# Patient Record
Sex: FEMALE | Race: WHITE | NOT HISPANIC OR LATINO | Employment: OTHER | URBAN - METROPOLITAN AREA
[De-identification: names, ages, dates, MRNs, and addresses within clinical notes are randomized per-mention and may not be internally consistent; named-entity substitution may affect disease eponyms.]

---

## 2021-02-26 ENCOUNTER — IMMUNIZATION (OUTPATIENT)
Dept: PEDIATRICS | Facility: CLINIC | Age: 71
End: 2021-02-26
Payer: COMMERCIAL

## 2021-02-26 PROCEDURE — 91300 PR COVID VAC PFIZER DIL RECON 30 MCG/0.3 ML IM: CPT

## 2021-02-26 PROCEDURE — 0001A PR COVID VAC PFIZER DIL RECON 30 MCG/0.3 ML IM: CPT

## 2021-03-19 ENCOUNTER — IMMUNIZATION (OUTPATIENT)
Dept: PEDIATRICS | Facility: CLINIC | Age: 71
End: 2021-03-19
Attending: INTERNAL MEDICINE
Payer: COMMERCIAL

## 2021-03-19 PROCEDURE — 91300 PR COVID VAC PFIZER DIL RECON 30 MCG/0.3 ML IM: CPT

## 2021-03-19 PROCEDURE — 0002A PR COVID VAC PFIZER DIL RECON 30 MCG/0.3 ML IM: CPT

## 2023-07-19 ENCOUNTER — HOSPITAL ENCOUNTER (EMERGENCY)
Facility: CLINIC | Age: 73
Discharge: HOME | End: 2023-07-19
Attending: PHYSICIAN ASSISTANT | Admitting: PHYSICIAN ASSISTANT
Payer: COMMERCIAL

## 2023-07-19 VITALS
DIASTOLIC BLOOD PRESSURE: 68 MMHG | HEART RATE: 60 BPM | OXYGEN SATURATION: 98 % | SYSTOLIC BLOOD PRESSURE: 114 MMHG | TEMPERATURE: 97.6 F | RESPIRATION RATE: 14 BRPM

## 2023-07-19 DIAGNOSIS — S51.811A LACERATION OF RIGHT FOREARM, INITIAL ENCOUNTER: ICD-10-CM
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
## 2023-07-19 PROCEDURE — 250N000011 HC RX IP 250 OP 636: Performed by: PHYSICIAN ASSISTANT

## 2023-07-19 PROCEDURE — 12002 RPR S/N/AX/GEN/TRNK2.6-7.5CM: CPT

## 2023-07-19 PROCEDURE — 90471 IMMUNIZATION ADMIN: CPT | Performed by: PHYSICIAN ASSISTANT

## 2023-07-19 PROCEDURE — 99284 EMERGENCY DEPT VISIT MOD MDM: CPT | Mod: 25

## 2023-07-19 PROCEDURE — 90714 TD VACC NO PRESV 7 YRS+ IM: CPT | Performed by: PHYSICIAN ASSISTANT

## 2023-07-19 RX ORDER — SULFAMETHOXAZOLE/TRIMETHOPRIM 800-160 MG
1 TABLET ORAL 2 TIMES DAILY
Qty: 14 TABLET | Refills: 0 | Status: ON HOLD | OUTPATIENT
Start: 2023-07-19 | End: 2023-07-22

## 2023-07-19 RX ORDER — CLINDAMYCIN HCL 300 MG
300 CAPSULE ORAL 3 TIMES DAILY
Qty: 21 CAPSULE | Refills: 0 | Status: SHIPPED | OUTPATIENT
Start: 2023-07-19 | End: 2023-07-19

## 2023-07-19 RX ORDER — CLINDAMYCIN HCL 300 MG
300 CAPSULE ORAL 3 TIMES DAILY
Qty: 21 CAPSULE | Refills: 0 | Status: ON HOLD | OUTPATIENT
Start: 2023-07-19 | End: 2023-07-22

## 2023-07-19 RX ORDER — SULFAMETHOXAZOLE/TRIMETHOPRIM 800-160 MG
1 TABLET ORAL 2 TIMES DAILY
Qty: 14 TABLET | Refills: 0 | Status: SHIPPED | OUTPATIENT
Start: 2023-07-19 | End: 2023-07-19

## 2023-07-19 RX ADMIN — CLOSTRIDIUM TETANI TOXOID ANTIGEN (FORMALDEHYDE INACTIVATED) AND CORYNEBACTERIUM DIPHTHERIAE TOXOID ANTIGEN (FORMALDEHYDE INACTIVATED) 0.5 ML: 5; 2 INJECTION, SUSPENSION INTRAMUSCULAR at 09:56

## 2023-07-19 NOTE — ED PROVIDER NOTES
History     Chief Complaint:  Dog Bite     HPI   Marla Spencer is a 72 year old female with a history of hyperlipidemia and essential tremor who presents for evaluation of a dog bite. The patient states that this morning, her two dogs were fighting over food and one of them bit her as she was trying to pull them off of each other. The bite wounds are to the anterior and posterior aspects of her right forearm. Notes that the area around the wounds is tender, but she is not concerned about any broken bones or retained teeth. Dogs are current with immunizations including rabies. Adds that she is allergic to penicillin. Tetanus from 2017.    Independent Historian:   None - Patient Only    Review of External Notes:   None     Medications:    Scopolamine  Duloxetine  Gabapentin   Simvastatin  Levothyroxine  Malarone   Trazodone  Primidone   Propranolol   Omeprazole     Past Medical History:    Essential tremor  Adenoma of colon  Hyperparathyroidism  LEONEL  Elevated parathyroid hormone  Dysthymia  Insomnia  Depression  Hyperlipidemia  Osteopenia  GERD  Fibromyalgia  IBS  Hypothyroidism   Migraine     Past Surgical History:    Appendectomy  Tonsillectomy   section x 2  Laparoscopic cholecystectomy  Parathyroidectomy   Breast biopsy     Physical Exam     Patient Vitals for the past 24 hrs:   BP Temp Pulse Resp SpO2   23 0800 114/68 97.6  F (36.4  C) 60 14 98 %      Physical Exam  Constitutional: Alert, attentive, GCS 15  HENT:    Nose: Nose normal.    Mouth/Throat: Oropharynx is clear, mucous membranes are moist   Eyes: EOM are normal.   CV: regular rate and rhythm; no murmurs, rubs or gallups  Chest: Effort normal and breath sounds normal.   GI:  There is no tenderness. No distension. Normal bowel sounds  MSK: Normal range of motion of right upper extremity. Radial pulse intact. Distal sensation maintained in right upper extremities.  Neurological: Alert, attentive  Skin: Skin is warm and dry. Bite marks to  right forearm. No active bleeding.     Emergency Department Course   Procedures     Laceration Repair #1     Procedure: Laceration Repair    Indication: Laceration    Consent: Verbal    Location: Right Forearm     Length: 1 x 1.5 cm    Preparation: Irrigation with Sterile Saline and Wound Cleanser.     Anesthesia/Sedation: Topical -LET      Treatment/Exploration: Wound explored, no foreign bodies found     Closure: The wound was closed with one layer. Skin/superficial layer was closed with 2 x 5-0 Nylon using Interrupted sutures.     Patient Status: The patient tolerated the procedure well: Yes. There were no complications.      Laceration Repair #2     Procedure: Laceration Repair    Indication: Laceration    Consent: Verbal    Location: Right Forearm     Length: 2.0 cm    Preparation: Irrigation with Sterile Saline and Wound Cleanser.     Anesthesia/Sedation: Topical -LET      Treatment/Exploration: Wound explored, no foreign bodies found     Closure: The wound was closed with one layer. Skin/superficial layer was closed with 3 x 5-0 Nylon using Interrupted sutures.     Patient Status: The patient tolerated the procedure well: Yes. There were no complications.      Laceration Repair #3     Procedure: Laceration Repair    Indication: Laceration    Consent: Verbal    Location: Right Forearm     Length: 0.5 cm    Preparation: Irrigation with Sterile Saline and Wound Cleanser.     Anesthesia/Sedation: Topical -LET      Treatment/Exploration: Wound explored, no foreign bodies found     Closure: The wound was closed with one layer. Skin/superficial layer was closed with 1 x 5-0 Nylon using Interrupted sutures.     Patient Status: The patient tolerated the procedure well: Yes. There were no complications.    Emergency Department Course & Assessments:     Interventions:  Medications   Td (tetanus & diphtheria toxoids) -  adult formulation - for ages 7 years and older (0.5 mLs Intramuscular $Given 7/19/23 0956)       Assessments:  0925 I obtained history and examined the patient as noted above.   1009 I rechecked and updated the patient. I performed laceration repair procedures as noted above.     Independent Interpretation (X-rays, CTs, rhythm strip):  None    Consultations/Discussion of Management or Tests:  None        Social Determinants of Health affecting care:   None    Disposition:  The patient was discharged to home.     Impression & Plan    Medical Decision Making:  Patient is a well-appearing 69-year-old female who presents with dog bites to right forearm after breaking up a fight between her dogs at home.  Dogs are current with vaccinations including rabies.  Vitals appropriate.  Physical examination reveals 3 deep lacerations to the right forearm.  Right upper extremity remains neurovascularly intact.  Low suspicion for fracture or retained foreign bodies.  X-rays are not indicated.  Lacerations were anesthetized and evaluated.  Irrigation was performed as above.  No evidence of retained foreign bodies.  Lacerations were quite deep so they were closed to prevent infection.  Patient tolerated procedure well.  Patient will be sent home with antibiotics as below.  She is otherwise well-appearing and may be managed as an outpatient.  Follow-up with primary care for suture removal as indicated in discharge.  Supportive cares and return precautions to ED were discussed and patient ready for discharge.  Tetanus was updated.    Diagnosis:    ICD-10-CM    1. Dog bite, initial encounter  W54.0XXA       2. Laceration of right forearm, initial encounter  S51.811A            Discharge Medications:  Discharge Medication List as of 7/19/2023 10:46 AM      START taking these medications    Details   clindamycin (CLEOCIN) 300 MG capsule Take 1 capsule (300 mg) by mouth 3 times daily for 7 days, Disp-21 capsule, R-0, Local Print      sulfamethoxazole-trimethoprim (BACTRIM DS) 800-160 MG tablet Take 1 tablet by mouth 2 times daily  for 7 days, Disp-14 tablet, R-0, Local Print            Scribe Disclosure:  I, Akua Mahoney, am serving as a scribe at 9:46 AM on 7/19/2023 to document services personally performed by Almita Saucedo PA-C based on my observations and the provider's statements to me.     7/19/2023   Almita Saucedo PA-C Steinbrueck, Emily, PA-C  07/19/23 1701

## 2023-07-19 NOTE — DISCHARGE INSTRUCTIONS
Keep wounds clean and dry.  You may remove the bandage in 24 hours and gently clean twice daily with soap and water.  Watch for signs of infections as we discussed.  He will be sent home with an antibiotic.  Please take as prescribed.  Follow-up with primary care for suture removal in 10 days.  For any new or worsening symptoms such as signs of infection, return to ED.

## 2023-07-19 NOTE — ED TRIAGE NOTES
Was bitten by own dog this morning on the R arm. Dog UTD on rabies vaccine. Tetanus not UTD Bleeding controlled in triage. New bandage applied. ABCs intact

## 2023-07-20 ENCOUNTER — HOSPITAL ENCOUNTER (OUTPATIENT)
Facility: CLINIC | Age: 73
Setting detail: OBSERVATION
Discharge: HOME | End: 2023-07-22
Attending: EMERGENCY MEDICINE | Admitting: STUDENT IN AN ORGANIZED HEALTH CARE EDUCATION/TRAINING PROGRAM
Payer: COMMERCIAL

## 2023-07-20 DIAGNOSIS — L03.113 CELLULITIS OF RIGHT UPPER EXTREMITY: ICD-10-CM

## 2023-07-20 DIAGNOSIS — S41.152A DOG BITE OF ARM, LEFT, INITIAL ENCOUNTER: Primary | ICD-10-CM

## 2023-07-20 DIAGNOSIS — K59.00 CONSTIPATION, UNSPECIFIED CONSTIPATION TYPE: ICD-10-CM
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
## 2023-07-20 LAB
ANION GAP SERPL CALCULATED.3IONS-SCNC: 13 MMOL/L (ref 7–15)
BASOPHILS # BLD AUTO: 0 10E3/UL (ref 0–0.2)
BASOPHILS NFR BLD AUTO: 0 %
BUN SERPL-MCNC: 12.2 MG/DL (ref 8–23)
CALCIUM SERPL-MCNC: 9.2 MG/DL (ref 8.8–10.2)
CHLORIDE SERPL-SCNC: 100 MMOL/L (ref 98–107)
CREAT SERPL-MCNC: 1.03 MG/DL (ref 0.51–0.95)
DEPRECATED HCO3 PLAS-SCNC: 25 MMOL/L (ref 22–29)
EOSINOPHIL # BLD AUTO: 0.1 10E3/UL (ref 0–0.7)
EOSINOPHIL NFR BLD AUTO: 1 %
ERYTHROCYTE [DISTWIDTH] IN BLOOD BY AUTOMATED COUNT: 12.2 % (ref 10–15)
GFR SERPL CREATININE-BSD FRML MDRD: 57 ML/MIN/1.73M2
GLUCOSE SERPL-MCNC: 89 MG/DL (ref 70–99)
HCT VFR BLD AUTO: 37.4 % (ref 35–47)
HGB BLD-MCNC: 12.4 G/DL (ref 11.7–15.7)
HOLD SPECIMEN: NORMAL
IMM GRANULOCYTES # BLD: 0 10E3/UL
IMM GRANULOCYTES NFR BLD: 0 %
LACTATE SERPL-SCNC: 0.7 MMOL/L (ref 0.7–2)
LYMPHOCYTES # BLD AUTO: 1.6 10E3/UL (ref 0.8–5.3)
LYMPHOCYTES NFR BLD AUTO: 18 %
MCH RBC QN AUTO: 31 PG (ref 26.5–33)
MCHC RBC AUTO-ENTMCNC: 33.2 G/DL (ref 31.5–36.5)
MCV RBC AUTO: 94 FL (ref 78–100)
MONOCYTES # BLD AUTO: 0.8 10E3/UL (ref 0–1.3)
MONOCYTES NFR BLD AUTO: 9 %
NEUTROPHILS # BLD AUTO: 6.2 10E3/UL (ref 1.6–8.3)
NEUTROPHILS NFR BLD AUTO: 72 %
NRBC # BLD AUTO: 0 10E3/UL
NRBC BLD AUTO-RTO: 0 /100
PLATELET # BLD AUTO: 239 10E3/UL (ref 150–450)
POTASSIUM SERPL-SCNC: 4.4 MMOL/L (ref 3.4–5.3)
RBC # BLD AUTO: 4 10E6/UL (ref 3.8–5.2)
SODIUM SERPL-SCNC: 138 MMOL/L (ref 136–145)
WBC # BLD AUTO: 8.6 10E3/UL (ref 4–11)

## 2023-07-20 PROCEDURE — G0378 HOSPITAL OBSERVATION PER HR: HCPCS

## 2023-07-20 PROCEDURE — 250N000011 HC RX IP 250 OP 636: Mod: JZ | Performed by: EMERGENCY MEDICINE

## 2023-07-20 PROCEDURE — 99285 EMERGENCY DEPT VISIT HI MDM: CPT | Mod: 25

## 2023-07-20 PROCEDURE — 80048 BASIC METABOLIC PNL TOTAL CA: CPT | Performed by: EMERGENCY MEDICINE

## 2023-07-20 PROCEDURE — 99223 1ST HOSP IP/OBS HIGH 75: CPT | Mod: AI | Performed by: PHYSICIAN ASSISTANT

## 2023-07-20 PROCEDURE — 250N000013 HC RX MED GY IP 250 OP 250 PS 637: Performed by: PHYSICIAN ASSISTANT

## 2023-07-20 PROCEDURE — 83605 ASSAY OF LACTIC ACID: CPT | Performed by: EMERGENCY MEDICINE

## 2023-07-20 PROCEDURE — 99207 PR NO BILLABLE SERVICE THIS VISIT: CPT | Performed by: STUDENT IN AN ORGANIZED HEALTH CARE EDUCATION/TRAINING PROGRAM

## 2023-07-20 PROCEDURE — 96375 TX/PRO/DX INJ NEW DRUG ADDON: CPT

## 2023-07-20 PROCEDURE — 36415 COLL VENOUS BLD VENIPUNCTURE: CPT | Performed by: EMERGENCY MEDICINE

## 2023-07-20 PROCEDURE — 250N000013 HC RX MED GY IP 250 OP 250 PS 637: Performed by: EMERGENCY MEDICINE

## 2023-07-20 PROCEDURE — 96365 THER/PROPH/DIAG IV INF INIT: CPT

## 2023-07-20 PROCEDURE — 85004 AUTOMATED DIFF WBC COUNT: CPT | Performed by: EMERGENCY MEDICINE

## 2023-07-20 RX ORDER — ACETAMINOPHEN 500 MG
1000 TABLET ORAL ONCE
Status: COMPLETED | OUTPATIENT
Start: 2023-07-20 | End: 2023-07-20

## 2023-07-20 RX ORDER — ACETAMINOPHEN 325 MG/1
975 TABLET ORAL EVERY 8 HOURS
Status: DISCONTINUED | OUTPATIENT
Start: 2023-07-21 | End: 2023-07-22 | Stop reason: HOSPADM

## 2023-07-20 RX ORDER — GABAPENTIN 300 MG/1
600 CAPSULE ORAL AT BEDTIME
COMMUNITY

## 2023-07-20 RX ORDER — SIMVASTATIN 20 MG
20 TABLET ORAL AT BEDTIME
Status: DISCONTINUED | OUTPATIENT
Start: 2023-07-20 | End: 2023-07-22 | Stop reason: HOSPADM

## 2023-07-20 RX ORDER — GABAPENTIN 300 MG/1
600 CAPSULE ORAL AT BEDTIME
Status: DISCONTINUED | OUTPATIENT
Start: 2023-07-20 | End: 2023-07-22 | Stop reason: HOSPADM

## 2023-07-20 RX ORDER — ONDANSETRON 2 MG/ML
4 INJECTION INTRAMUSCULAR; INTRAVENOUS EVERY 6 HOURS PRN
Status: DISCONTINUED | OUTPATIENT
Start: 2023-07-20 | End: 2023-07-22 | Stop reason: HOSPADM

## 2023-07-20 RX ORDER — METRONIDAZOLE 500 MG/100ML
500 INJECTION, SOLUTION INTRAVENOUS EVERY 8 HOURS
Status: DISCONTINUED | OUTPATIENT
Start: 2023-07-21 | End: 2023-07-22 | Stop reason: HOSPADM

## 2023-07-20 RX ORDER — LANOLIN ALCOHOL/MO/W.PET/CERES
2 CREAM (GRAM) TOPICAL 2 TIMES DAILY WITH MEALS
COMMUNITY
End: 2023-07-20

## 2023-07-20 RX ORDER — OXYCODONE HYDROCHLORIDE 5 MG/1
5 TABLET ORAL EVERY 4 HOURS PRN
Status: DISCONTINUED | OUTPATIENT
Start: 2023-07-20 | End: 2023-07-22 | Stop reason: HOSPADM

## 2023-07-20 RX ORDER — LEVOTHYROXINE SODIUM 100 UG/1
100 TABLET ORAL
COMMUNITY

## 2023-07-20 RX ORDER — AMOXICILLIN 250 MG
1 CAPSULE ORAL 2 TIMES DAILY
Status: DISCONTINUED | OUTPATIENT
Start: 2023-07-20 | End: 2023-07-22 | Stop reason: HOSPADM

## 2023-07-20 RX ORDER — DULOXETIN HYDROCHLORIDE 30 MG/1
30 CAPSULE, DELAYED RELEASE ORAL AT BEDTIME
Status: DISCONTINUED | OUTPATIENT
Start: 2023-07-20 | End: 2023-07-22 | Stop reason: HOSPADM

## 2023-07-20 RX ORDER — ACETAMINOPHEN 500 MG
500-1000 TABLET ORAL EVERY 4 HOURS PRN
COMMUNITY

## 2023-07-20 RX ORDER — TRAZODONE HYDROCHLORIDE 100 MG/1
100 TABLET ORAL AT BEDTIME
Status: DISCONTINUED | OUTPATIENT
Start: 2023-07-20 | End: 2023-07-22 | Stop reason: HOSPADM

## 2023-07-20 RX ORDER — VITAMIN B COMPLEX
25 TABLET ORAL DAILY
COMMUNITY

## 2023-07-20 RX ORDER — LIDOCAINE 40 MG/G
CREAM TOPICAL
Status: DISCONTINUED | OUTPATIENT
Start: 2023-07-20 | End: 2023-07-20

## 2023-07-20 RX ORDER — DULOXETIN HYDROCHLORIDE 30 MG/1
30 CAPSULE, DELAYED RELEASE ORAL AT BEDTIME
COMMUNITY

## 2023-07-20 RX ORDER — LEVOTHYROXINE SODIUM 25 UG/1
50 TABLET ORAL WEEKLY
Status: DISCONTINUED | OUTPATIENT
Start: 2023-07-26 | End: 2023-07-22

## 2023-07-20 RX ORDER — TRAZODONE HYDROCHLORIDE 100 MG/1
100 TABLET ORAL AT BEDTIME
COMMUNITY

## 2023-07-20 RX ORDER — CEFTRIAXONE 2 G/1
2 INJECTION, POWDER, FOR SOLUTION INTRAMUSCULAR; INTRAVENOUS ONCE
Status: COMPLETED | OUTPATIENT
Start: 2023-07-20 | End: 2023-07-20

## 2023-07-20 RX ORDER — AMOXICILLIN 250 MG
2 CAPSULE ORAL 2 TIMES DAILY
Status: DISCONTINUED | OUTPATIENT
Start: 2023-07-20 | End: 2023-07-22 | Stop reason: HOSPADM

## 2023-07-20 RX ORDER — LEVOTHYROXINE SODIUM 100 UG/1
50 TABLET ORAL WEEKLY
COMMUNITY

## 2023-07-20 RX ORDER — DULOXETIN HYDROCHLORIDE 20 MG/1
20 CAPSULE, DELAYED RELEASE ORAL DAILY
Status: DISCONTINUED | OUTPATIENT
Start: 2023-07-21 | End: 2023-07-22 | Stop reason: HOSPADM

## 2023-07-20 RX ORDER — METRONIDAZOLE 500 MG/100ML
500 INJECTION, SOLUTION INTRAVENOUS ONCE
Status: COMPLETED | OUTPATIENT
Start: 2023-07-20 | End: 2023-07-20

## 2023-07-20 RX ORDER — PRIMIDONE 50 MG/1
50 TABLET ORAL AT BEDTIME
Status: DISCONTINUED | OUTPATIENT
Start: 2023-07-20 | End: 2023-07-22 | Stop reason: HOSPADM

## 2023-07-20 RX ORDER — CEFTRIAXONE 1 G/1
1 INJECTION, POWDER, FOR SOLUTION INTRAMUSCULAR; INTRAVENOUS EVERY 24 HOURS
Status: DISCONTINUED | OUTPATIENT
Start: 2023-07-21 | End: 2023-07-22

## 2023-07-20 RX ORDER — PRIMIDONE 50 MG/1
50 TABLET ORAL AT BEDTIME
COMMUNITY

## 2023-07-20 RX ORDER — ONDANSETRON 4 MG/1
4 TABLET, ORALLY DISINTEGRATING ORAL EVERY 6 HOURS PRN
Status: DISCONTINUED | OUTPATIENT
Start: 2023-07-20 | End: 2023-07-22 | Stop reason: HOSPADM

## 2023-07-20 RX ORDER — SIMVASTATIN 20 MG
20 TABLET ORAL AT BEDTIME
COMMUNITY

## 2023-07-20 RX ORDER — MULTIPLE VITAMINS W/ MINERALS TAB 9MG-400MCG
1 TAB ORAL DAILY
COMMUNITY

## 2023-07-20 RX ORDER — DULOXETIN HYDROCHLORIDE 20 MG/1
20 CAPSULE, DELAYED RELEASE ORAL EVERY MORNING
COMMUNITY

## 2023-07-20 RX ADMIN — GABAPENTIN 600 MG: 300 CAPSULE ORAL at 21:46

## 2023-07-20 RX ADMIN — OXYCODONE HYDROCHLORIDE 2.5 MG: 5 TABLET ORAL at 21:48

## 2023-07-20 RX ADMIN — TRAZODONE HYDROCHLORIDE 100 MG: 100 TABLET ORAL at 21:47

## 2023-07-20 RX ADMIN — SIMVASTATIN 20 MG: 20 TABLET, FILM COATED ORAL at 22:19

## 2023-07-20 RX ADMIN — CEFTRIAXONE 2 G: 2 INJECTION, POWDER, FOR SOLUTION INTRAMUSCULAR; INTRAVENOUS at 16:08

## 2023-07-20 RX ADMIN — METRONIDAZOLE 500 MG: 500 INJECTION, SOLUTION INTRAVENOUS at 17:05

## 2023-07-20 RX ADMIN — SENNOSIDES AND DOCUSATE SODIUM 1 TABLET: 50; 8.6 TABLET ORAL at 21:49

## 2023-07-20 RX ADMIN — DULOXETINE HYDROCHLORIDE 30 MG: 30 CAPSULE, DELAYED RELEASE ORAL at 21:47

## 2023-07-20 RX ADMIN — ACETAMINOPHEN 1000 MG: 500 TABLET, FILM COATED ORAL at 16:08

## 2023-07-20 ASSESSMENT — ACTIVITIES OF DAILY LIVING (ADL)
ADLS_ACUITY_SCORE: 37
ADLS_ACUITY_SCORE: 35
ADLS_ACUITY_SCORE: 37
ADLS_ACUITY_SCORE: 33
ADLS_ACUITY_SCORE: 37

## 2023-07-20 NOTE — ED NOTES
[facility]  ED Nurse Handoff Report    ED Chief complaint: Dog Bite  . ED Diagnosis:   Final diagnoses:   None       Allergies:   Allergies   Allergen Reactions     Penicillins Anaphylaxis     Adhesive Tape      REDNESS AROUND SITE     Latex      ITCHY THROAT  PN: sneezing ?? gloves         Code Status: Full Code    Activity level - Baseline/Home:  independent.  Activity Level - Current:   standby.   Lift room needed: No.   Bariatric: No   Needed: No   Isolation: No.   Infection: Not Applicable.     Respiratory status: Room air    Vital Signs (within 30 minutes):   Vitals:    07/20/23 1201   BP: (!) 134/95   Pulse: 77   Resp: 17   Temp: 98  F (36.7  C)   TempSrc: Temporal   SpO2: 100%       Cardiac Rhythm:  ,      Pain level:    Patient confused: No.   Patient Falls Risk: patient and family education.   Elimination Status: Has voided     Patient Report - Initial Complaint: Dog bite.   Focused Assessment: Pt complains of dog bite that happened at 0700 yesterday morning. Pt states since then there has been an increase in redness & swelling. Pt was seen here yesterday and told to come back for redness & swelling. Pt states dog is up to date on vaccines.     Abnormal Results:   Labs Ordered and Resulted from Time of ED Arrival to Time of ED Departure   BASIC METABOLIC PANEL - Abnormal       Result Value    Sodium 138      Potassium 4.4      Chloride 100      Carbon Dioxide (CO2) 25      Anion Gap 13      Urea Nitrogen 12.2      Creatinine 1.03 (*)     Calcium 9.2      Glucose 89      GFR Estimate 57 (*)    LACTIC ACID WHOLE BLOOD - Normal    Lactic Acid 0.7     CBC WITH PLATELETS AND DIFFERENTIAL    WBC Count 8.6      RBC Count 4.00      Hemoglobin 12.4      Hematocrit 37.4      MCV 94      MCH 31.0      MCHC 33.2      RDW 12.2      Platelet Count 239      % Neutrophils 72      % Lymphocytes 18      % Monocytes 9      % Eosinophils 1      % Basophils 0      % Immature Granulocytes 0       NRBCs per 100 WBC 0      Absolute Neutrophils 6.2      Absolute Lymphocytes 1.6      Absolute Monocytes 0.8      Absolute Eosinophils 0.1      Absolute Basophils 0.0      Absolute Immature Granulocytes 0.0      Absolute NRBCs 0.0          No orders to display       Treatments provided: See MAR  Family Comments: pt able to update family  OBS brochure/video discussed/provided to patient:  yes  ED Medications:   Medications   metroNIDAZOLE (FLAGYL) infusion 500 mg (has no administration in time range)   acetaminophen (TYLENOL) tablet 1,000 mg (1,000 mg Oral $Given 7/20/23 160)   cefTRIAXone (ROCEPHIN) 2 g vial to attach to  ml bag for ADULTS or NS 50 ml bag for PEDS (2 g Intravenous $New Bag 7/20/23 1608)       Drips infusing:  No  For the majority of the shift this patient was Green.   Interventions performed were NA.    Sepsis treatment initiated: No    Cares/treatment/interventions/medications to be completed following ED care: NA    ED Nurse Name: Kasia Garcia RN  4:57 PM    RECEIVING UNIT ED HANDOFF REVIEW    Above ED Nurse Handoff Report was reviewed: Yes  Reviewed by: Sahara Damico RN on July 20, 2023 at 8:43 PM

## 2023-07-20 NOTE — ED PROVIDER NOTES
History     Chief Complaint:  Dog Bite       HPI   Marla Spencer is a 72 year old female who presents with right arm redness and pain after her dog bit her yesterday morning.  She was seen in the ER with laceration repair and antibiotic initiation.  Despite this, she has had progressive redness of the right forearm.  No fever or other symptoms.  Patient states her dog is up-to-date on its vaccinations including rabies vaccine.      Medications:    clindamycin (CLEOCIN) 300 MG capsule  sulfamethoxazole-trimethoprim (BACTRIM DS) 800-160 MG tablet        Past Medical History:    No past medical history on file.    Past Surgical History:    No past surgical history on file.     Physical Exam     Patient Vitals for the past 24 hrs:   BP Temp Temp src Pulse Resp SpO2   07/20/23 1201 (!) 134/95 98  F (36.7  C) Temporal 77 17 100 %        Physical Exam  VS: Reviewed per above  HENT: Mucous membranes moist  EYES: sclera anicteric  CV: Rate as noted,  regular rhythm.   RESP: Effort normal. Breath sounds are normal bilaterally.  NEURO: Alert, moving all extremities  MSK: No deformity of the extremities  SKIN: Warm and dry, right forearm redness and warmth without underlying fluctuance or crepitance.  A few loosely approximated lacerations appreciated in the right forearm.      Emergency Department Course     Laboratory:  Labs Ordered and Resulted from Time of ED Arrival to Time of ED Departure   BASIC METABOLIC PANEL - Abnormal       Result Value    Sodium 138      Potassium 4.4      Chloride 100      Carbon Dioxide (CO2) 25      Anion Gap 13      Urea Nitrogen 12.2      Creatinine 1.03 (*)     Calcium 9.2      Glucose 89      GFR Estimate 57 (*)    LACTIC ACID WHOLE BLOOD - Normal    Lactic Acid 0.7     CBC WITH PLATELETS AND DIFFERENTIAL    WBC Count 8.6      RBC Count 4.00      Hemoglobin 12.4      Hematocrit 37.4      MCV 94      MCH 31.0      MCHC 33.2      RDW 12.2      Platelet Count 239      % Neutrophils 72      %  Lymphocytes 18      % Monocytes 9      % Eosinophils 1      % Basophils 0      % Immature Granulocytes 0      NRBCs per 100 WBC 0      Absolute Neutrophils 6.2      Absolute Lymphocytes 1.6      Absolute Monocytes 0.8      Absolute Eosinophils 0.1      Absolute Basophils 0.0      Absolute Immature Granulocytes 0.0      Absolute NRBCs 0.0          Emergency Department Course & Assessments:             Interventions:  Medications   melatonin tablet 1 mg (has no administration in time range)   ondansetron (ZOFRAN ODT) ODT tab 4 mg (has no administration in time range)     Or   ondansetron (ZOFRAN) injection 4 mg (has no administration in time range)   lidocaine 1 % 0.1-1 mL (has no administration in time range)   lidocaine (LMX4) cream (has no administration in time range)   sodium chloride (PF) 0.9% PF flush 3 mL (has no administration in time range)   sodium chloride (PF) 0.9% PF flush 3 mL (has no administration in time range)   ondansetron (ZOFRAN ODT) ODT tab 4 mg (has no administration in time range)     Or   ondansetron (ZOFRAN) injection 4 mg (has no administration in time range)   acetaminophen (TYLENOL) tablet 975 mg (has no administration in time range)   oxyCODONE IR (ROXICODONE) half-tab 2.5 mg (has no administration in time range)   oxyCODONE (ROXICODONE) tablet 5 mg (has no administration in time range)   senna-docusate (SENOKOT-S/PERICOLACE) 8.6-50 MG per tablet 1 tablet (has no administration in time range)     Or   senna-docusate (SENOKOT-S/PERICOLACE) 8.6-50 MG per tablet 2 tablet (has no administration in time range)   cefTRIAXone (ROCEPHIN) 1 g vial to attach to  mL bag for ADULTS or NS 50 mL bag for PEDS (has no administration in time range)   metroNIDAZOLE (FLAGYL) infusion 500 mg (has no administration in time range)   acetaminophen (TYLENOL) tablet 1,000 mg (1,000 mg Oral $Given 7/20/23 5156)   cefTRIAXone (ROCEPHIN) 2 g vial to attach to  ml bag for ADULTS or NS 50 ml bag for PEDS  (2 g Intravenous $New Bag 7/20/23 1600)   metroNIDAZOLE (FLAGYL) infusion 500 mg (500 mg Intravenous $New Bag 7/20/23 1702)          Disposition:  The patient was admitted to the hospital under the care of Dr. galindo.     Impression & Plan      Medical Decision Making:  Patient presents to the ER for evaluation of right forearm redness and warmth 1 day after dog bite.  Vital signs reassuring.  Her dog is reportedly up-to-date on its vaccinations.  I do not appreciate signs of necrotizing skin or soft tissue infection.  No signs of sepsis today but given rapid progression of symptoms, plan for hospital admission for IV antibiotics.  Patient was admitted to hospitalist team for further cares.    Diagnosis:    ICD-10-CM    1. Cellulitis of right upper extremity  L03.113            Discharge Medications:  New Prescriptions    No medications on file           Elier Vang MD  07/20/23 8783

## 2023-07-20 NOTE — PLAN OF CARE
[facility]    ED Boarding Nurse Handoff Addendum Report:    Date/time: 7/20/2023, 6:55 PM    Activity Level: independent    Fall Risk: No    Active Infusions: n/a    Current Meds Due: n/a    Current care needs: n/a    Oxygen requirements (liters/min and/or FiO2): RA    Respiratory status: Room air    Vital signs (within last 30 minutes):    Vitals:    07/20/23 1201   BP: (!) 134/95   Pulse: 77   Resp: 17   Temp: 98  F (36.7  C)   TempSrc: Temporal   SpO2: 100%       Focused assessment within last 30 minutes:    R forearm red, cellulitis outlined in marker. Stiches to R forearm. Reg diet.     ED Boarding Nurse name: Xin Escamilla RN

## 2023-07-20 NOTE — ED TRIAGE NOTES
Pt complains of dog bite that happened at 0700 yesterday morning. Pt states since then there has been an increase in redness & swelling. Pt was seen here yesterday and told to come back for redness & swelling. Pt states dog is up to date on vaccines.

## 2023-07-21 LAB
ANION GAP SERPL CALCULATED.3IONS-SCNC: 8 MMOL/L (ref 7–15)
BUN SERPL-MCNC: 12.5 MG/DL (ref 8–23)
CALCIUM SERPL-MCNC: 8.3 MG/DL (ref 8.8–10.2)
CHLORIDE SERPL-SCNC: 105 MMOL/L (ref 98–107)
CREAT SERPL-MCNC: 1.01 MG/DL (ref 0.51–0.95)
CRP SERPL-MCNC: 34.66 MG/L
DEPRECATED HCO3 PLAS-SCNC: 25 MMOL/L (ref 22–29)
ERYTHROCYTE [DISTWIDTH] IN BLOOD BY AUTOMATED COUNT: 12.2 % (ref 10–15)
GFR SERPL CREATININE-BSD FRML MDRD: 58 ML/MIN/1.73M2
GLUCOSE SERPL-MCNC: 97 MG/DL (ref 70–99)
HCT VFR BLD AUTO: 34.9 % (ref 35–47)
HGB BLD-MCNC: 11.6 G/DL (ref 11.7–15.7)
MCH RBC QN AUTO: 31.2 PG (ref 26.5–33)
MCHC RBC AUTO-ENTMCNC: 33.2 G/DL (ref 31.5–36.5)
MCV RBC AUTO: 94 FL (ref 78–100)
PLATELET # BLD AUTO: 203 10E3/UL (ref 150–450)
POTASSIUM SERPL-SCNC: 3.9 MMOL/L (ref 3.4–5.3)
RBC # BLD AUTO: 3.72 10E6/UL (ref 3.8–5.2)
SODIUM SERPL-SCNC: 138 MMOL/L (ref 136–145)
WBC # BLD AUTO: 6.4 10E3/UL (ref 4–11)

## 2023-07-21 PROCEDURE — 96376 TX/PRO/DX INJ SAME DRUG ADON: CPT

## 2023-07-21 PROCEDURE — 86140 C-REACTIVE PROTEIN: CPT | Performed by: PHYSICIAN ASSISTANT

## 2023-07-21 PROCEDURE — 99232 SBSQ HOSP IP/OBS MODERATE 35: CPT | Performed by: INTERNAL MEDICINE

## 2023-07-21 PROCEDURE — 36415 COLL VENOUS BLD VENIPUNCTURE: CPT | Performed by: PHYSICIAN ASSISTANT

## 2023-07-21 PROCEDURE — G0378 HOSPITAL OBSERVATION PER HR: HCPCS

## 2023-07-21 PROCEDURE — 250N000011 HC RX IP 250 OP 636: Mod: JZ | Performed by: PHYSICIAN ASSISTANT

## 2023-07-21 PROCEDURE — 250N000013 HC RX MED GY IP 250 OP 250 PS 637: Performed by: HOSPITALIST

## 2023-07-21 PROCEDURE — 80048 BASIC METABOLIC PNL TOTAL CA: CPT | Performed by: PHYSICIAN ASSISTANT

## 2023-07-21 PROCEDURE — 85027 COMPLETE CBC AUTOMATED: CPT | Performed by: PHYSICIAN ASSISTANT

## 2023-07-21 PROCEDURE — 250N000013 HC RX MED GY IP 250 OP 250 PS 637: Performed by: PHYSICIAN ASSISTANT

## 2023-07-21 PROCEDURE — 250N000013 HC RX MED GY IP 250 OP 250 PS 637: Performed by: INTERNAL MEDICINE

## 2023-07-21 RX ORDER — GABAPENTIN 300 MG/1
600 CAPSULE ORAL AT BEDTIME
Status: DISCONTINUED | OUTPATIENT
Start: 2023-07-21 | End: 2023-07-21

## 2023-07-21 RX ORDER — NALOXONE HYDROCHLORIDE 0.4 MG/ML
0.4 INJECTION, SOLUTION INTRAMUSCULAR; INTRAVENOUS; SUBCUTANEOUS
Status: DISCONTINUED | OUTPATIENT
Start: 2023-07-21 | End: 2023-07-22 | Stop reason: HOSPADM

## 2023-07-21 RX ORDER — NALOXONE HYDROCHLORIDE 0.4 MG/ML
0.2 INJECTION, SOLUTION INTRAMUSCULAR; INTRAVENOUS; SUBCUTANEOUS
Status: DISCONTINUED | OUTPATIENT
Start: 2023-07-21 | End: 2023-07-22 | Stop reason: HOSPADM

## 2023-07-21 RX ORDER — NAPROXEN 250 MG/1
250 TABLET ORAL 2 TIMES DAILY PRN
Status: DISCONTINUED | OUTPATIENT
Start: 2023-07-21 | End: 2023-07-22 | Stop reason: HOSPADM

## 2023-07-21 RX ORDER — DULOXETIN HYDROCHLORIDE 20 MG/1
20 CAPSULE, DELAYED RELEASE ORAL EVERY MORNING
Status: DISCONTINUED | OUTPATIENT
Start: 2023-07-21 | End: 2023-07-21

## 2023-07-21 RX ORDER — PANTOPRAZOLE SODIUM 40 MG/1
40 TABLET, DELAYED RELEASE ORAL
Status: DISCONTINUED | OUTPATIENT
Start: 2023-07-22 | End: 2023-07-22 | Stop reason: HOSPADM

## 2023-07-21 RX ORDER — DULOXETIN HYDROCHLORIDE 30 MG/1
30 CAPSULE, DELAYED RELEASE ORAL AT BEDTIME
Status: DISCONTINUED | OUTPATIENT
Start: 2023-07-21 | End: 2023-07-21

## 2023-07-21 RX ADMIN — SENNOSIDES AND DOCUSATE SODIUM 1 TABLET: 50; 8.6 TABLET ORAL at 08:42

## 2023-07-21 RX ADMIN — TRAZODONE HYDROCHLORIDE 100 MG: 100 TABLET ORAL at 21:42

## 2023-07-21 RX ADMIN — Medication 1 TABLET: at 18:36

## 2023-07-21 RX ADMIN — NAPROXEN 250 MG: 250 TABLET ORAL at 11:47

## 2023-07-21 RX ADMIN — CEFTRIAXONE 1 G: 1 INJECTION, POWDER, FOR SOLUTION INTRAMUSCULAR; INTRAVENOUS at 16:26

## 2023-07-21 RX ADMIN — METRONIDAZOLE 500 MG: 500 INJECTION, SOLUTION INTRAVENOUS at 17:06

## 2023-07-21 RX ADMIN — OXYCODONE HYDROCHLORIDE 2.5 MG: 5 TABLET ORAL at 05:33

## 2023-07-21 RX ADMIN — ACETAMINOPHEN 975 MG: 325 TABLET, FILM COATED ORAL at 00:53

## 2023-07-21 RX ADMIN — DULOXETINE HYDROCHLORIDE 20 MG: 20 CAPSULE, DELAYED RELEASE ORAL at 08:42

## 2023-07-21 RX ADMIN — DULOXETINE HYDROCHLORIDE 30 MG: 30 CAPSULE, DELAYED RELEASE ORAL at 21:42

## 2023-07-21 RX ADMIN — SENNOSIDES AND DOCUSATE SODIUM 1 TABLET: 50; 8.6 TABLET ORAL at 21:42

## 2023-07-21 RX ADMIN — METRONIDAZOLE 500 MG: 500 INJECTION, SOLUTION INTRAVENOUS at 01:39

## 2023-07-21 RX ADMIN — ACETAMINOPHEN 975 MG: 325 TABLET, FILM COATED ORAL at 16:26

## 2023-07-21 RX ADMIN — GABAPENTIN 600 MG: 300 CAPSULE ORAL at 21:42

## 2023-07-21 RX ADMIN — PRIMIDONE 50 MG: 50 TABLET ORAL at 21:42

## 2023-07-21 RX ADMIN — LEVOTHYROXINE SODIUM 100 MCG: 0.07 TABLET ORAL at 08:42

## 2023-07-21 RX ADMIN — PRIMIDONE 50 MG: 50 TABLET ORAL at 00:54

## 2023-07-21 RX ADMIN — METRONIDAZOLE 500 MG: 500 INJECTION, SOLUTION INTRAVENOUS at 08:43

## 2023-07-21 RX ADMIN — ACETAMINOPHEN 975 MG: 325 TABLET, FILM COATED ORAL at 08:41

## 2023-07-21 ASSESSMENT — ACTIVITIES OF DAILY LIVING (ADL)
ADLS_ACUITY_SCORE: 22
ADLS_ACUITY_SCORE: 37
ADLS_ACUITY_SCORE: 22
ADLS_ACUITY_SCORE: 37
ADLS_ACUITY_SCORE: 22

## 2023-07-21 NOTE — H&P
[facility]  Admission History and Physical Examination    NAME: Marla Spencer   : 1950   MRN: [id number]     Date of Admission:  2023    Assessment & Plan   Marla Spencer is a 72 year old female with PMH significant for essential tremor, hyperparathyroidism, hypothyroidism, GERD, fibromyalgia who presents for the 2nd time in 2 days for concerns of right arm cellulitis after a dog bite.  She actually presented to the emergency room yesterday when 2 of her dogs were fighting over food and one of them accidentally bit her.  Her dogs are current with vaccination including rabies.  Her lacerations were closed and she was discharged with clindamycin and Bactrim.  She took the doses as recommended last night and this morning however she noticed increasing erythema swelling and pain on her right forearm near her bite sites but no constitutional symptoms such as fevers or chills.  Also no wrist or elbow joint involvement.  She came to the emergency room for further evaluation.    #Right forearm marked cellulitis due to recent dog bite  Her right forearm certainly is more red and swollen and tender along the lateral bite evangelina.  There was a tiny bit of purulent drainage by the suture but no areas of fluctuance to suggest a deeper infection.  She will need to be admitted for IV antibiotics  Plan:  -Continue Rocephin and Flagyl  -Reported allergy to penicillin, however has been able to tolerate Keflex in the past  -Elevate right arm as much as possible above the level of the heart  -As needed ice for comfort  -Pain meds as needed    #Hyperparathyroidism/hypothyroidism  -We will resume medications once reconciled    #All of her medical conditions are stable including depression, anxiety, fibromyalgia  -Resume home meds once they are reconciled    Awaiting formal pharmacy reconciliation to resume home medications.     DVT Prophylaxis: Low Risk/Ambulatory with no VTE prophylaxis indicated  Code Status:  Full Code       Expected Discharge Date: 07/21/2023               Qian Mtz PA-C    Primary Care Physician   Aspen Neumann MD    Chief Complaint   Right arm cellulitis     History is obtained from the patient    Discussed with Dr. Vang in the ED, full chart review including lab work, imaging, and vital signs were reviewed.     History of Present Illness   Marla Spencer is a 72 year old female with PMH significant for essential tremor, hyperparathyroidism, hypothyroidism, GERD, fibromyalgia who presents for the 2nd time in 2 days for concerns of right arm cellulitis after a dog bite.  She actually presented to the emergency room yesterday when 2 of her dogs were fighting over food and one of them accidentally bit her right forearm.  Her dogs are current with vaccinations including rabies.  Her lacerations were closed and she was discharged with clindamycin and Bactrim.  She took the doses as recommended last night and this morning however she noticed increasing erythema swelling and pain on her right forearm near her bite sites but no constitutional symptoms such as fevers or chills.  No elbow or joint involvement. She came to the emergency room for further evaluation.    Past Medical History    I have reviewed this patient's medical history and updated it with pertinent information if needed.       Past Surgical History   I have reviewed this patient's surgical history and updated it with pertinent information if needed.      Prior to Admission Medications   Prior to Admission Medications   Prescriptions Last Dose Informant Patient Reported? Taking?   clindamycin (CLEOCIN) 300 MG capsule   No No   Sig: Take 1 capsule (300 mg) by mouth 3 times daily   sulfamethoxazole-trimethoprim (BACTRIM DS) 800-160 MG tablet   No No   Sig: Take 1 tablet by mouth 2 times daily      Facility-Administered Medications: None     Allergies   Allergies   Allergen Reactions    Penicillins Anaphylaxis    Adhesive Tape       REDNESS AROUND SITE    Latex      ITCHY THROAT  PN: sneezing ?? gloves         Social History   I have reviewed this patient's social history and updated it with pertinent information if needed. Marla HERNANDES Tj    Non-smoker nonalcohol user  Family History   I have reviewed this patient's family history and updated it with pertinent information if needed.       Review of Systems   The 10 point Review of Systems is negative other than noted in the HPI or here.     Physical Exam   Temp: 98  F (36.7  C) Temp src: Temporal BP: (!) 134/95 Pulse: 77   Resp: 17 SpO2: 100 % O2 Device: None (Room air)    Vital Signs with Ranges  Temp:  [98  F (36.7  C)] 98  F (36.7  C)  Pulse:  [77] 77  Resp:  [17] 17  BP: (134)/(95) 134/95  SpO2:  [100 %] 100 %  0 lbs 0 oz    Constitutional: Awake, alert,  no apparent distress.  Eyes: Conjunctiva and pupils examined and normal.  HEENT: Moist mucous membranes, normal dentition.  Respiratory: Clear to auscultation bilaterally, no crackles or wheezing.  Cardiovascular: Regular rate and rhythm, normal S1 and S2, and no murmur noted.  GI: Soft, non-distended, non-tender, bowel sounds present. No rebound tenderness or guarding.  Lymph/Hematologic: No anterior cervical or supraclavicular adenopathy.  Skin: No rashes, no cyanosis.  Right arm with noticeable canine bite evangelina medially and laterally consistent with dog bite.  These were closed primarily with nylon sutures.  The lateral suture has surrounding swelling erythema and tenderness with extension towards the wrist as well as extending proximally to the mid arm.  Erythema and edema outlined.  Musculoskeletal: No deformities noted.  No erythema or tenderness. Moving all extremities.  Neurologic: No focal deficits noted. Speech is clear. Coordination and strength grossly normal.   Psychiatric: Appropriate affect.    Data   Data reviewed today:      Imaging: No results found for this or any previous visit (from the past 24 hour(s)).    Recent  Labs   Lab 07/20/23  1545   WBC 8.6   HGB 12.4   MCV 94         POTASSIUM 4.4   CHLORIDE 100   CO2 25   BUN 12.2   CR 1.03*   ANIONGAP 13   AUDRA 9.2   GLC 89           Qian Mtz PA-C  [facility]  July 20, 2023  Securely message with the Vocera Web Console (learn more here)  Text page via [employer] Paging/Directory

## 2023-07-21 NOTE — PLAN OF CARE
Goal Outcome Evaluation:         Vital Signs: WNL. Patient afebrile.   Pain/Comfort: patient rating pain as a 2-5/10. PRN   Assessment: R forearm red and swollen - within markings - stiches intact and no drainage noted. PIV site c/d/i and flushed.   Diet: patient tolerating PO intake. Fluids encouraged.   Output: patient voiding independently.   Activity/Ambulation: patient up independently in room.   Social: patient calm and cooperative.   Plan: Pain control. Monitor VS. Maintain PIV. IV ABX. Monitor I&O. Monitor/assess R forearm. Will continue to monitor and will notify service with any questions or concerns.

## 2023-07-21 NOTE — PROVIDER NOTIFICATION
asking for her home meds'  trazadone   nanda  simvastatin  duloxetine  Thanks  Sent to admitting page - obs via web based page

## 2023-07-21 NOTE — PROGRESS NOTES
[facility]    Hospitalist Progress Note    Date of Service (when I saw the patient): 07/21/2023    Assessment & Plan   Marla Spencer is a pleasant 72 year old female with PMH significant for essential tremor, hyperparathyroidism, hypothyroidism, GERD, fibromyalgia who presents for the 2nd time in 2 days for concerns of right arm cellulitis after a dog bite.  She actually presented to the emergency room yesterday when 2 of her dogs were fighting over food and one of them accidentally bit her.  Her dogs are current with vaccination including rabies.  Her lacerations were closed and she was discharged with clindamycin and Bactrim.  She took the doses as recommended last night and this morning however she noticed increasing erythema swelling and pain on her right forearm near her bite sites but no constitutional symptoms such as fevers or chills.  Also no wrist or elbow joint involvement.  She came to the emergency room for further evaluation.    Current problems include:     Right forearm cellulitis due to recent dog bite  - possible slight improvement today  - per prior: right forearm certainly is more red and swollen and tender along the lateral bite evangelina.  There was a tiny bit of purulent drainage by the suture but no areas of fluctuance to suggest a deeper infection.  - continue ceftriaxone and Flagyl  - reported allergy to penicillin, however has been able to tolerate Keflex in the past  - elevate right arm when possible  - continue PRN analgesics  -> ID consult 7/22 to advise on current Abx and optimal duration     Hyperparathyroidism  Hypothyroidism  - resume PTA synthroid     Depression  Anxiety, fibromyalgia; all stable.  - resume PTA Cymbalta and gabapentin  - resume trazodone       DVT Prophylaxis: Low Risk/Ambulatory with no VTE prophylaxis indicated  Code Status: Full Code    Disposition: Expected discharge in 1-2 days.        MIKO Kraft MD, FACP     [facility]  Hospitalist  Text Page (7am - 6pm)    Interval History   Reviewed care w/ RN and w/ patient.  Marla is feeling somewhat better, but redness may have extended slightly above the marker line since yesterday.  No f/c/SOB; feels constipated - no BM x 2+ days.  Appetite good.  Marla is scheduled to go out of the country (to [facility]) on 7/31 with family.    Data reviewed today: I reviewed all new labs and imaging results over the last 24 hours.     Physical Exam   Temp: 98.8  F (37.1  C) Temp src: Oral BP: 125/75 Pulse: 77   Resp: 16 SpO2: 94 % O2 Device: None (Room air)    Vitals:    07/20/23 2125   Weight: 70.6 kg (155 lb 9.6 oz)     Vital Signs with Ranges  Temp:  [97.6  F (36.4  C)-98.8  F (37.1  C)] 98.8  F (37.1  C)  Pulse:  [65-81] 77  Resp:  [16-18] 16  BP: ()/(46-75) 125/75  Cuff Mean (mmHg):  [94] 94  SpO2:  [94 %-99 %] 94 %  I/O last 3 completed shifts:  In: 90 [P.O.:90]  Out: -     Constitutional: awake, no apparent distress; lying in bed  HEENT: sclerae clear; MM's moist  Respiratory: good a/e bilaterally, no wheezing or rhonchi  Cardiovascular: regular rate and rhythm, S1, S2 noted; no m/r/g  GI: abdomen moderately obese, + bowel sounds; soft, mild diffuse tenderness, non-distended  Skin/Integumen: Erythema of Right forearm; extends slightly above markings from yesterday, 7/20; mild tenderness; no rashes, no cyanosis, no jaundice  Musculoskeletal: trace edema Right forearm  Neurologic: follows directions well; no focal deficits      Medications       acetaminophen  975 mg Oral Q8H     cefTRIAXone  1 g Intravenous Q24H     DULoxetine  20 mg Oral Daily     DULoxetine  30 mg Oral At Bedtime     gabapentin  600 mg Oral At Bedtime     levothyroxine  100 mcg Oral Once per day on Sun Mon Tue Thu Fri Sat     [START ON 7/26/2023] levothyroxine  50 mcg Oral Weekly     metroNIDAZOLE  500 mg Intravenous Q8H     [START ON 7/22/2023] pantoprazole  40 mg Oral QAM AC     primidone  50 mg Oral At Bedtime      senna-docusate  1 tablet Oral BID    Or     senna-docusate  2 tablet Oral BID     simvastatin  20 mg Oral At Bedtime     traZODone  100 mg Oral At Bedtime       Data   Recent Labs   Lab 07/21/23  0643 07/20/23  1545   WBC 6.4 8.6   HGB 11.6* 12.4   MCV 94 94    239    138   POTASSIUM 3.9 4.4   CHLORIDE 105 100   CO2 25 25   BUN 12.5 12.2   CR 1.01* 1.03*   ANIONGAP 8 13   AUDRA 8.3* 9.2   GLC 97 89

## 2023-07-21 NOTE — PLAN OF CARE
Goal Outcome Evaluation:         Patient admitted to room 24 from ED. Initial nursing assessment and admission questions completed. Patient oriented to room and unit procedures. VSS. Patient stating pain as a 5/10. PRN pain medications given per MAR. PIV site c/d/I and flushed. VSS. R forearm red and warm - stitches present - no drainage noted. Patient is resting comfortably in bed. Will continue to monitor and will notify service with any questions or concerns.

## 2023-07-21 NOTE — PLAN OF CARE
Goal Outcome Evaluation:         Vital Signs: VSS. Afebrile.  Pain/Comfort: Pain rated as 3/10, well managed with oral pain meds.   Assessment: Arm slightly pink, small amount of swelling. Puncture sites c/d/I ,.  Diet: Regular diet  Output: Voiding  Activity/Ambulation:Up independently

## 2023-07-21 NOTE — PHARMACY-ADMISSION MEDICATION HISTORY
Pharmacist Admission Medication History    Admission medication history is complete. The information provided in this note is only as accurate as the sources available at the time of the update.    Medication reconciliation/reorder completed by provider prior to medication history? No    Information Source(s): Patient via in-person    Pertinent Information: Med list was provided by pt    Changes made to PTA medication list:    Added: all but antibiotics    Deleted: None    Changed: None    Medication Affordability:       Allergies reviewed with patient and updates made in EHR: no    Medication History Completed By: August Washington RPH 7/20/2023 9:05 PM    Prior to Admission medications    Medication Sig Last Dose Taking? Auth Provider Long Term End Date   acetaminophen (TYLENOL) 500 MG tablet Take 500-1,000 mg by mouth every 4 hours as needed for mild pain Max 3000mg of tylenol in 24h period.  Yes Unknown, Entered By History     CALCIUM-MAGNESIUM-VITAMIN D PO Take 2 tablets by mouth 2 times daily (with meals) 7/20/2023 at am Yes Unknown, Entered By History     clindamycin (CLEOCIN) 300 MG capsule Take 1 capsule (300 mg) by mouth 3 times daily 7/20/2023 at am Yes Almita Saucedo PA-C     DULoxetine (CYMBALTA) 20 MG capsule Take 20 mg by mouth every morning 7/20/2023 at am Yes Unknown, Entered By History Yes    DULoxetine (CYMBALTA) 30 MG capsule Take 30 mg by mouth At Bedtime 7/19/2023 at hs Yes Unknown, Entered By History Yes    gabapentin (NEURONTIN) 300 MG capsule Take 600 mg by mouth At Bedtime 7/19/2023 at hs Yes Unknown, Entered By History Yes    Glucosamine-Chondroitin (GLUCOSAMINE CHONDR COMPLEX PO) Take by mouth daily  Yes Unknown, Entered By History     levothyroxine (SYNTHROID/LEVOTHROID) 100 MCG tablet Take 100 mcg by mouth six times a week TAKE 1 TABLET (100 MCG) BY MOUTH SIX TIMES A WEEK AND 1/2 TABLET (50 MCG) ONCE EVERY WEEK. 7/19/2023 at am Yes Unknown, Entered By History Yes     levothyroxine (SYNTHROID/LEVOTHROID) 100 MCG tablet Take 50 mcg by mouth once a week Wednesday 7/20/2023 at am Yes Unknown, Entered By History Yes    multivitamin w/minerals (THERA-VIT-M) tablet Take 1 tablet by mouth daily 7/20/2023 Yes Unknown, Entered By History     omeprazole (PRILOSEC) 20 MG DR capsule Take 20 mg by mouth every morning (before breakfast) 7/20/2023 at am Yes Unknown, Entered By History     primidone (MYSOLINE) 50 MG tablet Take 50 mg by mouth At Bedtime 7/19/2023 at hs Yes Unknown, Entered By History Yes    simvastatin (ZOCOR) 20 MG tablet Take 20 mg by mouth At Bedtime 7/19/2023 at hs Yes Unknown, Entered By History Yes    sulfamethoxazole-trimethoprim (BACTRIM DS) 800-160 MG tablet Take 1 tablet by mouth 2 times daily 7/20/2023 at am Yes lAmita Saucedo PA-C     traZODone (DESYREL) 100 MG tablet Take 100 mg by mouth At Bedtime 7/19/2023 at hs Yes Unknown, Entered By History Yes    Vitamin D3 (CHOLECALCIFEROL) 25 mcg (1000 units) tablet Take 25 mcg by mouth daily 7/19/2023 at am Yes Unknown, Entered By History

## 2023-07-22 VITALS
WEIGHT: 155.6 LBS | HEART RATE: 76 BPM | DIASTOLIC BLOOD PRESSURE: 64 MMHG | OXYGEN SATURATION: 95 % | SYSTOLIC BLOOD PRESSURE: 122 MMHG | TEMPERATURE: 98.2 F | RESPIRATION RATE: 14 BRPM
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
## 2023-07-22 PROBLEM — S41.152A DOG BITE OF ARM, LEFT, INITIAL ENCOUNTER: Status: ACTIVE | Noted: 2023-07-22

## 2023-07-22 LAB
ERYTHROCYTE [DISTWIDTH] IN BLOOD BY AUTOMATED COUNT: 12.2 % (ref 10–15)
HCT VFR BLD AUTO: 38.3 % (ref 35–47)
HGB BLD-MCNC: 12.3 G/DL (ref 11.7–15.7)
MCH RBC QN AUTO: 30.5 PG (ref 26.5–33)
MCHC RBC AUTO-ENTMCNC: 32.1 G/DL (ref 31.5–36.5)
MCV RBC AUTO: 95 FL (ref 78–100)
PLATELET # BLD AUTO: 270 10E3/UL (ref 150–450)
RBC # BLD AUTO: 4.03 10E6/UL (ref 3.8–5.2)
WBC # BLD AUTO: 7 10E3/UL (ref 4–11)

## 2023-07-22 PROCEDURE — 250N000013 HC RX MED GY IP 250 OP 250 PS 637: Performed by: INTERNAL MEDICINE

## 2023-07-22 PROCEDURE — G0378 HOSPITAL OBSERVATION PER HR: HCPCS

## 2023-07-22 PROCEDURE — 250N000011 HC RX IP 250 OP 636: Mod: JZ | Performed by: INTERNAL MEDICINE

## 2023-07-22 PROCEDURE — 99238 HOSP IP/OBS DSCHRG MGMT 30/<: CPT | Performed by: INTERNAL MEDICINE

## 2023-07-22 PROCEDURE — 36415 COLL VENOUS BLD VENIPUNCTURE: CPT | Performed by: INTERNAL MEDICINE

## 2023-07-22 PROCEDURE — 96376 TX/PRO/DX INJ SAME DRUG ADON: CPT

## 2023-07-22 PROCEDURE — 250N000013 HC RX MED GY IP 250 OP 250 PS 637: Performed by: PHYSICIAN ASSISTANT

## 2023-07-22 PROCEDURE — 85014 HEMATOCRIT: CPT | Performed by: INTERNAL MEDICINE

## 2023-07-22 PROCEDURE — 250N000009 HC RX 250: Performed by: INTERNAL MEDICINE

## 2023-07-22 PROCEDURE — 250N000011 HC RX IP 250 OP 636: Performed by: PHYSICIAN ASSISTANT

## 2023-07-22 RX ORDER — TRAZODONE HYDROCHLORIDE 100 MG/1
100 TABLET ORAL AT BEDTIME
Status: DISCONTINUED | OUTPATIENT
Start: 2023-07-22 | End: 2023-07-22 | Stop reason: HOSPADM

## 2023-07-22 RX ORDER — MULTIPLE VITAMINS W/ MINERALS TAB 9MG-400MCG
1 TAB ORAL DAILY
Status: DISCONTINUED | OUTPATIENT
Start: 2023-07-22 | End: 2023-07-22 | Stop reason: HOSPADM

## 2023-07-22 RX ORDER — CEFTRIAXONE 1 G/1
1 INJECTION, POWDER, FOR SOLUTION INTRAMUSCULAR; INTRAVENOUS EVERY 12 HOURS
Status: DISCONTINUED | OUTPATIENT
Start: 2023-07-22 | End: 2023-07-22 | Stop reason: HOSPADM

## 2023-07-22 RX ORDER — CEFUROXIME AXETIL 500 MG/1
500 TABLET ORAL 2 TIMES DAILY
Qty: 14 TABLET | Refills: 0 | Status: SHIPPED | OUTPATIENT
Start: 2023-07-22

## 2023-07-22 RX ORDER — GINSENG 100 MG
CAPSULE ORAL 2 TIMES DAILY
Start: 2023-07-22

## 2023-07-22 RX ORDER — VITAMIN B COMPLEX
25 TABLET ORAL DAILY
Status: DISCONTINUED | OUTPATIENT
Start: 2023-07-22 | End: 2023-07-22 | Stop reason: HOSPADM

## 2023-07-22 RX ORDER — SIMVASTATIN 20 MG
20 TABLET ORAL AT BEDTIME
Status: DISCONTINUED | OUTPATIENT
Start: 2023-07-22 | End: 2023-07-22 | Stop reason: HOSPADM

## 2023-07-22 RX ORDER — GINSENG 100 MG
CAPSULE ORAL 2 TIMES DAILY
Status: DISCONTINUED | OUTPATIENT
Start: 2023-07-22 | End: 2023-07-22 | Stop reason: HOSPADM

## 2023-07-22 RX ORDER — LEVOTHYROXINE SODIUM 25 UG/1
50 TABLET ORAL WEEKLY
Status: DISCONTINUED | OUTPATIENT
Start: 2023-07-26 | End: 2023-07-22 | Stop reason: HOSPADM

## 2023-07-22 RX ORDER — SENNA AND DOCUSATE SODIUM 50; 8.6 MG/1; MG/1
2 TABLET, FILM COATED ORAL DAILY
Qty: 20 TABLET | Refills: 0 | Status: SHIPPED | OUTPATIENT
Start: 2023-07-22

## 2023-07-22 RX ORDER — METRONIDAZOLE 500 MG/1
500 TABLET ORAL 3 TIMES DAILY
Qty: 21 TABLET | Refills: 0 | Status: SHIPPED | OUTPATIENT
Start: 2023-07-22

## 2023-07-22 RX ADMIN — ACETAMINOPHEN 975 MG: 325 TABLET, FILM COATED ORAL at 00:50

## 2023-07-22 RX ADMIN — ACETAMINOPHEN 975 MG: 325 TABLET, FILM COATED ORAL at 07:44

## 2023-07-22 RX ADMIN — DULOXETINE HYDROCHLORIDE 20 MG: 20 CAPSULE, DELAYED RELEASE ORAL at 07:45

## 2023-07-22 RX ADMIN — METRONIDAZOLE 500 MG: 500 INJECTION, SOLUTION INTRAVENOUS at 09:16

## 2023-07-22 RX ADMIN — PANTOPRAZOLE SODIUM 40 MG: 40 TABLET, DELAYED RELEASE ORAL at 06:39

## 2023-07-22 RX ADMIN — METRONIDAZOLE 500 MG: 500 INJECTION, SOLUTION INTRAVENOUS at 16:32

## 2023-07-22 RX ADMIN — SIMVASTATIN 20 MG: 20 TABLET, FILM COATED ORAL at 01:06

## 2023-07-22 RX ADMIN — SENNOSIDES AND DOCUSATE SODIUM 2 TABLET: 50; 8.6 TABLET ORAL at 07:45

## 2023-07-22 RX ADMIN — METRONIDAZOLE 500 MG: 500 INJECTION, SOLUTION INTRAVENOUS at 00:50

## 2023-07-22 RX ADMIN — Medication 1 TABLET: at 07:45

## 2023-07-22 RX ADMIN — Medication 25 MCG: at 16:32

## 2023-07-22 RX ADMIN — BACITRACIN: 500 OINTMENT TOPICAL at 16:32

## 2023-07-22 RX ADMIN — CEFTRIAXONE 1 G: 1 INJECTION, POWDER, FOR SOLUTION INTRAMUSCULAR; INTRAVENOUS at 07:45

## 2023-07-22 RX ADMIN — MULTIPLE VITAMINS W/ MINERALS TAB 1 TABLET: TAB at 16:32

## 2023-07-22 RX ADMIN — ACETAMINOPHEN 975 MG: 325 TABLET, FILM COATED ORAL at 16:32

## 2023-07-22 RX ADMIN — CEFTRIAXONE 1 G: 1 INJECTION, POWDER, FOR SOLUTION INTRAMUSCULAR; INTRAVENOUS at 18:19

## 2023-07-22 RX ADMIN — LEVOTHYROXINE SODIUM 100 MCG: 0.07 TABLET ORAL at 06:39

## 2023-07-22 ASSESSMENT — ACTIVITIES OF DAILY LIVING (ADL)
ADLS_ACUITY_SCORE: 22

## 2023-07-22 NOTE — PLAN OF CARE
Goal Outcome Evaluation:    Orientation: Alert and oriented x4  VSS. 97% on home cpap. afebrile.   LS: clear and equal bilaterally.   GI: Passing gas. no BM. Denies N/V. Pt complains of constipation. Heating pad for comfort.   : Adequate urine output.   Skin: cellulitis to right arm from dog bite s/p sutures. UTV, dressing remains in place overnight. Improvement in redness to arm.   Activity: Independent. Pt slept comfortably throughout shift.   Pain: 1/10. Right arm pain. Well controlled with scheduled tylenol.   Updates/Plan: continue with IV antibiotics pending ID consult later today. Continue with current cares.

## 2023-07-22 NOTE — PLAN OF CARE
Goal Outcome Evaluation:  Vital signs: No concerns  Pain/comfort: Meeting pain goal with scheduled Tylenol  Assessment: Light pink and slightly swollen; receeding from marked boarder; incisions clean and dry; washed with normal saline and clean, dry dressings applied  Nutrition: Tolerating regular diet  Output: Voiding; stool softener given for constipation  Activity/ambulation: Up independently; ambulating frequently  Plan: Consult ID tomorrow for home-going antibiotics plan

## 2023-07-22 NOTE — DISCHARGE SUMMARY
[facility] Discharge Summary    Marla Spencer MRN# [id number]   Age: 72 year old YOB: 1950     Date of Admission:  7/20/2023  Date of Discharge::  7/22/2023  Admitting Physician:  Oswaldo Turk MD  Discharge Physician:  Baldo Kinsey MD     Home clinic: [facility]           Admission Diagnoses:   Cellulitis of right upper extremity [L03.113]          Discharge Diagnosis:   Principal Problem:    Cellulitis of right upper extremity  Active Problems:    Dog bite of arm, left, initial encounter            Procedures:   none          Medications Prior to Admission:     Medications Prior to Admission   Medication Sig Dispense Refill Last Dose     acetaminophen (TYLENOL) 500 MG tablet Take 500-1,000 mg by mouth every 4 hours as needed for mild pain Max 3000mg of tylenol in 24h period.        CALCIUM-MAGNESIUM-VITAMIN D PO Take 2 tablets by mouth 2 times daily (with meals)   7/20/2023 at am     clindamycin (CLEOCIN) 300 MG capsule Take 1 capsule (300 mg) by mouth 3 times daily 21 capsule 0 7/20/2023 at am     DULoxetine (CYMBALTA) 20 MG capsule Take 20 mg by mouth every morning   7/20/2023 at am     DULoxetine (CYMBALTA) 30 MG capsule Take 30 mg by mouth At Bedtime   7/19/2023 at hs     gabapentin (NEURONTIN) 300 MG capsule Take 600 mg by mouth At Bedtime   7/19/2023 at hs     Glucosamine-Chondroitin (GLUCOSAMINE CHONDR COMPLEX PO) Take by mouth daily        levothyroxine (SYNTHROID/LEVOTHROID) 100 MCG tablet Take 100 mcg by mouth six times a week TAKE 1 TABLET (100 MCG) BY MOUTH SIX TIMES A WEEK AND 1/2 TABLET (50 MCG) ONCE EVERY WEEK.   7/19/2023 at am     levothyroxine (SYNTHROID/LEVOTHROID) 100 MCG tablet Take 50 mcg by mouth once a week Wednesday 7/20/2023 at am     multivitamin w/minerals (THERA-VIT-M) tablet Take 1 tablet by mouth daily   7/20/2023     omeprazole (PRILOSEC) 20 MG DR capsule Take 20 mg by mouth every morning (before breakfast)   7/20/2023 at am      primidone (MYSOLINE) 50 MG tablet Take 50 mg by mouth At Bedtime   7/19/2023 at hs     simvastatin (ZOCOR) 20 MG tablet Take 20 mg by mouth At Bedtime   7/19/2023 at hs     sulfamethoxazole-trimethoprim (BACTRIM DS) 800-160 MG tablet Take 1 tablet by mouth 2 times daily 14 tablet 0 7/20/2023 at am     traZODone (DESYREL) 100 MG tablet Take 100 mg by mouth At Bedtime   7/19/2023 at hs     Vitamin D3 (CHOLECALCIFEROL) 25 mcg (1000 units) tablet Take 25 mcg by mouth daily   7/19/2023 at am             Discharge Medications:     Current Discharge Medication List      START taking these medications    Details   bacitracin 500 UNIT/GM OINT Apply topically 2 times daily    Associated Diagnoses: Dog bite of arm, left, initial encounter      cefuroxime (CEFTIN) 500 MG tablet Take 1 tablet (500 mg) by mouth 2 times daily  Qty: 14 tablet, Refills: 0    Associated Diagnoses: Cellulitis of right upper extremity; Dog bite of arm, left, initial encounter      metroNIDAZOLE (FLAGYL) 500 MG tablet Take 1 tablet (500 mg) by mouth 3 times daily  Qty: 21 tablet, Refills: 0    Associated Diagnoses: Cellulitis of right upper extremity; Dog bite of arm, left, initial encounter      SENNA-docusate sodium (SENNA S) 8.6-50 MG tablet Take 2 tablets by mouth daily  Qty: 20 tablet, Refills: 0    Associated Diagnoses: Constipation, unspecified constipation type         CONTINUE these medications which have NOT CHANGED    Details   acetaminophen (TYLENOL) 500 MG tablet Take 500-1,000 mg by mouth every 4 hours as needed for mild pain Max 3000mg of tylenol in 24h period.      CALCIUM-MAGNESIUM-VITAMIN D PO Take 2 tablets by mouth 2 times daily (with meals)      !! DULoxetine (CYMBALTA) 20 MG capsule Take 20 mg by mouth every morning      !! DULoxetine (CYMBALTA) 30 MG capsule Take 30 mg by mouth At Bedtime      gabapentin (NEURONTIN) 300 MG capsule Take 600 mg by mouth At Bedtime      Glucosamine-Chondroitin (GLUCOSAMINE CHONDR COMPLEX PO) Take by  mouth daily      !! levothyroxine (SYNTHROID/LEVOTHROID) 100 MCG tablet Take 100 mcg by mouth six times a week TAKE 1 TABLET (100 MCG) BY MOUTH SIX TIMES A WEEK AND 1/2 TABLET (50 MCG) ONCE EVERY WEEK.      !! levothyroxine (SYNTHROID/LEVOTHROID) 100 MCG tablet Take 50 mcg by mouth once a week Wednesday      multivitamin w/minerals (THERA-VIT-M) tablet Take 1 tablet by mouth daily      omeprazole (PRILOSEC) 20 MG DR capsule Take 20 mg by mouth every morning (before breakfast)      primidone (MYSOLINE) 50 MG tablet Take 50 mg by mouth At Bedtime      simvastatin (ZOCOR) 20 MG tablet Take 20 mg by mouth At Bedtime      traZODone (DESYREL) 100 MG tablet Take 100 mg by mouth At Bedtime      Vitamin D3 (CHOLECALCIFEROL) 25 mcg (1000 units) tablet Take 25 mcg by mouth daily       !! - Potential duplicate medications found. Please discuss with provider.      STOP taking these medications       clindamycin (CLEOCIN) 300 MG capsule Comments:   Reason for Stopping:         sulfamethoxazole-trimethoprim (BACTRIM DS) 800-160 MG tablet Comments:   Reason for Stopping:                     Consultations:   No consultations were requested during this admission          Hospital Course:   Marla Spencer is a 72 year old female admitted on 7/20/2023 with right UE cellulitis associated with dog bite.     Ms. Spencer initially presented to the emergency department on 7/19/2023 just hours after she had been bitten on the right forearm by her own dog.  At that time she had primary closure of the laceration and was discharged from the ED on clindamycin and Bactrim.     Patient subsequently returned 24 hours later with increasing redness and pain in the area.  She was noted to NOT be septic (WBC 8.6, lactic acid 0.7, HR 72, afebrile, hemodynamically stable) admitted for IV antibiotics.    /63 (BP Location: Right arm)   Pulse 68   Temp 98  F (36.7  C) (Oral)   Resp 18   Wt 70.6 kg (155 lb 9.6 oz)   SpO2 96%   At the time of  discharge, Ms. Spencer is alert, coherent and in no apparent distress.  The right upper extremity where she has a couple of sutured lacerations (presumptively bite marks) there is mild surrounding erythema.  Otherwise though the more diffuse erythema that was outlined is now barely pink.  It is minimally tender without crepitance.          Discharge Instructions and Follow-Up:   Discharge diet: Regular   Discharge activity: Activity as tolerated   Discharge follow-up: Follow up with Dr. FOLEY next week on Thursday for suture removal and wound check           Discharge Disposition:   Discharged to home      Attestation:  I have reviewed today's vital signs, notes, medications, labs and imaging.    Baldo Kinsey MD      Prophylactic measure Prophylactic measure Prophylactic measure Prophylactic measure Prophylactic measure

## 2023-07-22 NOTE — PLAN OF CARE
Goal Outcome Evaluation:      Plan of Care Reviewed With: patient    Vital Signs: VSS. Afebrile.  Pain/Comfort: Minimal pain, well managed with tylenol.  Assessment: Area around stiches red. Mild edema. Larger area that had been pink almost all faded. Area cleansed and new gauze applied.  Diet: Eating and drinking well  Output: Voiding. Small BM.  Activity/Ambulation: Walking in halls, up in chair.  Social: Family here to visit  Plan: Will discharge after last 2 doses of antibiotic.

## 2023-07-23 ENCOUNTER — HOSPITAL ENCOUNTER (EMERGENCY)
Facility: CLINIC | Age: 73
Discharge: HOME | End: 2023-07-23
Attending: EMERGENCY MEDICINE | Admitting: EMERGENCY MEDICINE
Payer: COMMERCIAL

## 2023-07-23 VITALS
DIASTOLIC BLOOD PRESSURE: 107 MMHG | SYSTOLIC BLOOD PRESSURE: 176 MMHG | RESPIRATION RATE: 18 BRPM | WEIGHT: 159.83 LBS | HEART RATE: 75 BPM | OXYGEN SATURATION: 100 % | TEMPERATURE: 98.4 F

## 2023-07-23 DIAGNOSIS — T50.905A MEDICATION REACTION, INITIAL ENCOUNTER: ICD-10-CM

## 2023-07-23 DIAGNOSIS — R19.7 DIARRHEA, UNSPECIFIED TYPE: ICD-10-CM

## 2023-07-23 PROCEDURE — 99282 EMERGENCY DEPT VISIT SF MDM: CPT

## 2023-07-23 ASSESSMENT — ACTIVITIES OF DAILY LIVING (ADL): ADLS_ACUITY_SCORE: 33

## 2023-07-24 ENCOUNTER — LAB (OUTPATIENT)
Dept: LAB | Facility: CLINIC | Age: 73
End: 2023-07-24
Payer: COMMERCIAL

## 2023-07-24 DIAGNOSIS — R19.7 DIARRHEA, UNSPECIFIED TYPE: ICD-10-CM

## 2023-07-24 LAB — C DIFF TOX B STL QL: NEGATIVE

## 2023-07-24 PROCEDURE — 87493 C DIFF AMPLIFIED PROBE: CPT

## 2023-07-24 NOTE — DISCHARGE INSTRUCTIONS
Please get probiotic gummies or chewables and start tonight as well as imodium OTC    Discharge Instructions  Adult Diarrhea    You have been seen today for diarrhea (loose stools). This is usually caused by a virus, but some bacteria, parasites, medicines, or other medical conditions can cause similar symptoms. At this time your provider does not find that your diarrhea is a sign of anything dangerous or life-threatening. However, sometimes the signs of serious illness do not show up right away. If you have new or worse symptoms, you may need to be seen again in the Emergency Department or by your primary provider.     Generally, every Emergency Department visit should have a follow-up clinic visit with either a primary or a specialty clinic/provider. Please follow-up as instructed by your emergency provider today.    Return to the Emergency Department if:  You feel you are getting dehydrated, such as being very thirsty, not urinating (peeing) like usual, or feeling faint or lightheaded.   You develop a new fever.  You have abdominal (belly) pain that seems worse than cramps, is in one spot, or is getting worse over time.   You have blood in your stool or your stool becomes black.  (Remember that if you take Pepto-Bismol , this will turn your stool black).   You feel very weak.    What can I do to help myself?  The most important thing to do is to drink clear liquids.   It is best to have only small, frequent sips of liquids. Drinking too much at once may cause more diarrhea. You should also replace minerals, sodium and potassium lost with diarrhea. Pedialyte  and sports drinks can help you replace these minerals. You can also drink clear liquids such as water, weak tea, apple juice, and 7-Up . Avoid acidic liquids (orange juice), caffeine (coffee) or alcohol. Milk products will make the diarrhea worse.  Eat bland (plain) foods. Soda crackers, toast, plain noodles, gelatin, applesauce and bananas are good first  "choices. Avoid foods that have acid, are spicy, fatty or fibrous (such as meats, coarse grains, vegetables). You may start eating these foods again in about 3 days when you are better.   Sometimes treatment includes prescription medicine to prevent diarrhea. If your provider prescribes these for you, take them as directed.   Nonprescription medicine is available for the treatment of diarrhea and can be very effective. If you use it, make sure you use the dose recommended on the package. Check with your healthcare provider before you use any medicine for diarrhea.   Do not take ibuprofen, or other nonsteroidal anti-inflammatory medicines, without checking with your healthcare provider.   Probiotics: If you have been given an antibiotic, you may want to also take a probiotic pill or eat yogurt with live cultures. Probiotics have \"good bacteria\" to help your intestines stay healthy. Studies have shown that probiotics help prevent diarrhea and other intestine problems (including C. diff infection) when you take antibiotics. You can buy these without a prescription in the pharmacy section of the store.   If you were given a prescription for medicine here today, be sure to read all of the information (including the package insert) that comes with your prescription.  This will include important information about the medicine, its side effects, and any warnings that you need to know about.  The pharmacist who fills the prescription can provide more information and answer questions you may have about the medicine.  If you have questions or concerns that the pharmacist cannot address, please call or return to the Emergency Department.  Remember that you can always come back to the Emergency Department if you are not able to see your regular provider in the amount of time listed above, if you get any new symptoms, or if there is anything that worries you.    "

## 2023-07-24 NOTE — ED TRIAGE NOTES
On abx for dogbite. Discharged last night. Now having diarrhea that started this morning. 11 episodes today.

## 2023-07-24 NOTE — ED PROVIDER NOTES
History     Chief Complaint:  Medication Reaction     The history is provided by the patient.      Marla Spencer is a 72 year old female with a history of cellulitis of right upper extremity who presents with diarrhea which started today. She was in the hospital from 2023-2023 on antibiotics for cellulitis after a dog bite on her right arm. She denies being on probiotics. She was discharged on Flagyl and Ceftin. She is allergic to antibiotics. She adds she had constipation in th ED. No fevers or abdominal pain.      Independent Historian:   None - Patient Only        Medications:    Cefuroxime   Duloxetine  Gabapentin   Glucosamine-Chondroitin  Levothyroxine   Metronidazole   Omeprazole   Primidone   Senna-docusate sodium  Simvastatin   Trazodone   Vitamin D3  Atovaquone-proguanil     Past Medical History:    Primary hyperparathyroidism  Anxiety and depression  Sleep apnea  Fibromyalgia  Hyperlipidemia  Acid reflux   Migraines   Benign essential tremor  Adenoma of colon  Dysthymia  Insomnia  Major depressive disorder  Osteopenia   Esophageal reflux   IBS  Cellulitis of right upper extremity     Past Surgical History:    Tonsillectomy  Appendectomy   section  Cholecystectomy  Parathyroidectomy      Physical Exam     Patient Vitals for the past 24 hrs:   BP Temp Temp src Pulse Resp SpO2 Weight   23 2120 (!) 176/107 98.4  F (36.9  C) Oral 75 18 100 % 72.5 kg (159 lb 13.3 oz)      Physical Exam  Constitutional:  Oriented to person, place, and time.   HENT  Head:    Normocephalic.   Mouth/Throat:   Oropharynx is clear and moist.   Eyes:    EOM are normal. Pupils are equal, round, and reactive to light.   Neck:    Neck supple.   Cardiovascular:  Normal rate, regular rhythm and normal heart sounds.      Exam reveals no gallop and no friction rub.       No murmur heard.  Pulmonary/Chest:  Effort normal and breath sounds normal.      No respiratory distress. No wheezes. No rales.      No  reproducible chest wall pain.  Abdominal:   Soft. No distension. No tenderness. No rebound and no guarding.   Musculoskeletal:  Normal range of motion.   Neurological:   Alert and oriented to person, place, and time.           Moves all 4 extremities spontaneously    Skin:    No rash noted. No pallor. Healing infected dog bite wound to right forearm.     Sutures in place. Minimal surrounding erythema.     Emergency Department Course     Emergency Department Course & Assessments:    Independent Interpretation (X-rays, CTs, rhythm strip):  None    Assessments/Consultations/Discussion of Management or Tests:  ED Course as of 23 0035   Sun 2023   2971 I obtained the patient's history and examined as noted above.      Social Determinants of Health affecting care:   None    Disposition:  The patient was discharged to home.     Impression & Plan      Medical Decision Makin-year-old female that came in for diarrhea after being admitted to the hospital for infected dog bite.  She was discharged home on cefuroxime mean and Flagyl.  She has a known penicillin allergy.  Differential includes viral gastroenteritis, diarrhea secondary due to antibiotic use, C. difficile, or other causes.  She is otherwise vitally stable well-hydrated denies abdominal pain with a benign abdomen I see no reason for laboratory work or IV fluids.  She was unable to give stool sample here I did discharge her home with stool kit to return for C. difficile testing.  Short of this being C. difficile positive, I believe the benefits outweigh the risk and she needs continued use of antibiotics for her dog bite with known penicillin allergy and recent admission for this.  There is no good antibiotic alternative.  I did describe the importance of probiotic use and obtaining this in the form of yogurt, Gummies, chewables.  I also discussed Imodium OTC and staying hydrated.  She told return for worsening diarrhea feeling lightheaded  decreased urinary output abdominal pain fevers any new symptoms or concerns.    Diagnosis:    ICD-10-CM    1. Diarrhea, unspecified type  R19.7 C. difficile Toxin B PCR with reflex to C. difficile Antigen and Toxins A/B EIA      2. Medication reaction, initial encounter  T50.905A         Scribe Disclosure:  I, Theresa Chanell, am serving as a scribe at 10:50 PM on 7/23/2023 to document services personally performed by Clifford Anna MD based on my observations and the provider's statements to me.     7/23/2023   Clifford Anna MD Shapin, Ryan P, MD  07/24/23 3625

## 2023-07-24 NOTE — ED NOTES
Pt unable to leave stool. Dr. Anna made aware and ok to send stool collection kit home with the pt. Pt to return specimen to lab once collected.    Stool collection supply given to pt at discharge with education on collection and where to drop off sample. Pt verbalizes understanding.   History/Exam